# Patient Record
Sex: MALE | Race: WHITE | HISPANIC OR LATINO | Employment: FULL TIME | URBAN - METROPOLITAN AREA
[De-identification: names, ages, dates, MRNs, and addresses within clinical notes are randomized per-mention and may not be internally consistent; named-entity substitution may affect disease eponyms.]

---

## 2017-03-31 ENCOUNTER — OCCUPATIONAL MEDICINE (OUTPATIENT)
Dept: URGENT CARE | Facility: PHYSICIAN GROUP | Age: 47
End: 2017-03-31
Payer: COMMERCIAL

## 2017-03-31 VITALS
WEIGHT: 190 LBS | TEMPERATURE: 98.1 F | SYSTOLIC BLOOD PRESSURE: 124 MMHG | HEIGHT: 69 IN | HEART RATE: 69 BPM | OXYGEN SATURATION: 96 % | DIASTOLIC BLOOD PRESSURE: 86 MMHG | RESPIRATION RATE: 20 BRPM | BODY MASS INDEX: 28.14 KG/M2

## 2017-03-31 DIAGNOSIS — Z02.1 PRE-EMPLOYMENT DRUG SCREENING: ICD-10-CM

## 2017-03-31 DIAGNOSIS — S39.012A LOW BACK STRAIN, INITIAL ENCOUNTER: ICD-10-CM

## 2017-03-31 LAB
AMP AMPHETAMINE: NORMAL
COC COCAINE: NORMAL
INT CON NEG: NEGATIVE
INT CON POS: POSITIVE
MET METHAMPHETAMINES: NORMAL
OPI OPIATES: NORMAL
PCP PHENCYCLIDINE: NORMAL
POC DRUG COMMENT 753798-OCCUPATIONAL HEALTH: NORMAL
THC: NORMAL

## 2017-03-31 PROCEDURE — 80300 POCT 6 PANEL URINE DRUG SCREEN - INSTANT: CPT | Mod: 29 | Performed by: NURSE PRACTITIONER

## 2017-03-31 PROCEDURE — 99203 OFFICE O/P NEW LOW 30 MIN: CPT | Mod: 29 | Performed by: NURSE PRACTITIONER

## 2017-03-31 RX ORDER — ACETAMINOPHEN 325 MG/1
650 TABLET ORAL EVERY 4 HOURS PRN
COMMUNITY

## 2017-03-31 ASSESSMENT — ENCOUNTER SYMPTOMS
NAUSEA: 0
DIARRHEA: 0
FALLS: 0
ABDOMINAL PAIN: 0
SHORTNESS OF BREATH: 0
ORTHOPNEA: 0
TINGLING: 1
VOMITING: 0
SENSORY CHANGE: 1
COUGH: 0
MYALGIAS: 1
FEVER: 0
PALPITATIONS: 0
CHILLS: 0
BACK PAIN: 1
WEAKNESS: 0
CONSTIPATION: 0

## 2017-03-31 ASSESSMENT — PAIN SCALES - GENERAL: PAINLEVEL: 7=MODERATE-SEVERE PAIN

## 2017-03-31 NOTE — Clinical Note
"EMPLOYEE’S CLAIM FOR COMPENSATION/ REPORT OF INITIAL TREATMENT  FORM C-4    EMPLOYEE’S CLAIM - PROVIDE ALL INFORMATION REQUESTED   First Name  Abdoul Last Name  Jam Birthdate                    1970                Sex  male Claim Number   Home Address  [address] Age  46 y.o. Height  1.753 m (5' 9\") Weight  86.183 kg (190 lb) [facility]     [facility] [place]  [address] Telephone  [phone] (home)    Mailing Address  [address] [facility] [place]  [address] Primary Language Spoken  English    Insurer  Employers Ins Third Party    Employers Ins.   Employee's Occupation (Job Title) When Injury or Occupational Disease Occurred      Employer's Name  [employer]      Employer Address   [address] [place]   [address]   Date of Injury                 Hour of Injury   Date Employer Notified   Last Day of Work after Injury or Occupational Disease   Supervisor to Whom Injury Reported     Address or Location of Accident (if applicable)     What were you doing at the time of accident? (if applicable)      How did this injury or occupational disease occur? (Be specific an answer in detail. Use additional sheet if necessary)     If you believe that you have an occupational disease, when did you first have knowledge of the disability and it relationship to your employment?   Witnesses to the Accident        Nature of Injury or Occupational Disease    Part(s) of Body Injured or Affected  , ,     I certify that the above is true and correct to the best of my knowledge and that I have provided this information in order to obtain the benefits of [place]  I hereby authorize any physician, chiropractor, surgeon, practitioner, or other person, any hospital, " including [facility] hospital, any medical service organization, any insurance company, or other institution or organization to release to each other, any medical or other information, including benefits paid or payable, pertinent to this injury or disease, except information relative to diagnosis, treatment and/or counseling for AIDS, psychological conditions, alcohol or controlled substances, for which I must give specific authorization.  A Photostat of this authorization shall be as valid as the original.     Date   Place   Employee’s Signature   THIS REPORT MUST BE COMPLETED AND MAILED WITHIN 3 WORKING DAYS OF TREATMENT   Place  [facility]  Name of Facility  [place]   Date  3/31/2017 Diagnosis  (Z02.1) Pre-employment drug screening  (S39.012A) Low back strain, initial encounter Is there evidence the injured employee was under the influence of alcohol and/or another controlled substance at the time of accident?   Hour  9:22 AM Description of Injury or Disease  Diagnoses of Pre-employment drug screening and Low back strain, initial encounter were pertinent to this visit. No   Treatment  May use Ibuprofen up to  600 mg every 4-6 hours as needed for pain, may use ice/warm application as needed for pain up to 4x/day, may use topical analgesic for pain as needed, small muscle stretches as tolerated up to 4x/day.  Have you advised the patient to remain off work five days or more? No   X-Ray Findings      If Yes   From Date  To Date      From information given by the employee, together with medical evidence, can you directly connect this injury or occupational disease as job incurred?  Yes If No Full Duty  No Modified Duty  Yes   Is additional medical care by a physician indicated?  Yes If Modified Duty, Specify any Limitations / Restrictions  No push/pull, no stoop, bend, squat. No sitting > 2hrs/day. No reaching above or below with right arm. No carry/lift > 10#.   Do  "you know of any previous injury or disease contributing to this condition or occupational disease?                            No   Date  3/31/2017 Print Doctor’s Name MIMI Oconnor I certify the employer’s copy of  this form was mailed on:   Address  [address] [place] [address]. [address] Insurer’s Use Only     [facility] Zip  [address]    Provider’s Tax ID Number  [id number]  Telephone  Dept: [phone]        e-BENJAMIN Miranda   e-Signature: Dr. Kalyan Carson, Medical Director Degree  APRN        ORIGINAL-TREATING PHYSICIAN OR CHIROPRACTOR    PAGE 2-INSURER/TPA    PAGE 3-EMPLOYER    PAGE 4-EMPLOYEE             Form C-4 (rev10/07)              BRIEF DESCRIPTION OF RIGHTS AND BENEFITS  (Pursuant to [place] 616C.050)    Notice of Injury or Occupational Disease (Incident Report Form C-1): If an injury or occupational disease (OD) arises out of and in the  course of employment, you must provide written notice to your employer as soon as practicable, but no later than 7 days after the accident or  OD. Your employer shall maintain a sufficient supply of the required forms.    Claim for Compensation (Form C-4): If medical treatment is sought, the form C-4 is available at the place of initial treatment. A completed  \"Claim for Compensation\" (Form C-4) must be filed within 90 days after an accident or OD. The treating physician or chiropractor must,  within 3 working days after treatment, complete and mail to the employer, the employer's insurer and third-party , the Claim for  Compensation.    Medical Treatment: If you require medical treatment for your on-the-job injury or OD, you may be required to select a physician or  chiropractor from a list provided by your workers’ compensation insurer, if it has contracted with an Organization for Managed Care (MCO) or  Preferred Provider Organization (PPO) or providers of health care. If your employer has not entered into a contract with an " MCO or PPO, you  may select a physician or chiropractor from the Panel of Physicians and Chiropractors. Any medical costs related to your industrial injury or  OD will be paid by your insurer.    Temporary Total Disability (TTD): If your doctor has certified that you are unable to work for a period of at least 5 consecutive days, or 5  cumulative days in a 20-day period, or places restrictions on you that your employer does not accommodate, you may be entitled to TTD  compensation.    Temporary Partial Disability (TPD): If the wage you receive upon reemployment is less than the compensation for TTD to which you are  entitled, the insurer may be required to pay you TPD compensation to make up the difference. TPD can only be paid for a maximum of 24  months.    Permanent Partial Disability (PPD): When your medical condition is stable and there is an indication of a PPD as a result of your injury or  OD, within 30 days, your insurer must arrange for an evaluation by a rating physician or chiropractor to determine the degree of your PPD. The  amount of your PPD award depends on the date of injury, the results of the PPD evaluation and your age and wage.    Permanent Total Disability (PTD): If you are medically certified by a treating physician or chiropractor as permanently and totally disabled  and have been granted a PTD status by your insurer, you are entitled to receive monthly benefits not to exceed 66 2/3% of your average  monthly wage. The amount of your PTD payments is subject to reduction if you previously received a PPD award.    Vocational Rehabilitation Services: You may be eligible for vocational rehabilitation services if you are unable to return to the job due to a  permanent physical impairment or permanent restrictions as a result of your injury or occupational disease.    Transportation and Per Sid Reimbursement: You may be eligible for travel expenses and per sid associated with medical  treatment.    Reopening: You may be able to reopen your claim if your condition worsens after claim closure.    Appeal Process: If you disagree with a written determination issued by the insurer or the insurer does not respond to your request, you may  appeal to the Department of Administration, , by following the instructions contained in your determination letter. You must  appeal the determination within 70 days from the date of the determination letter at [address], [address], [place], [place]  [address], or [address], [address], [place], [place] [address]. If you disagree with the  decision, you may appeal to the  Department of Administration, . You must file your appeal within 30 days from the date of the  decision  letter at [address], [address], [place], [place] [address], or [address], [place], [place] [address]. If you  disagree with a decision of an , you may file a petition for judicial review with the District Court. You must do so within 30  days of the Appeal Officer’s decision. You may be represented by an  at your own expense or you may contact the [facility] for possible  representation.    [place]  for Injured Workers (NAIW): If you disagree with a  decision, you may request that NAIW represent you  without charge at an  Hearing. For information regarding denial of benefits, you may contact the [facility] at: [address], [address], [place] [address], [phone], or [address], [address], [place] [address], [phone]    To File a Complaint with the Division: If you wish to file a complaint with the  of the Division of Industrial Relations (DIR),  please contact the Workers’ Compensation Section, [address], [address], [place], [place] [address], telephone [phone], or  [address], [address], [place], [place] [address], telephone [phone].    For assistance with Workers’ Compensation Issues: you may contact the Office of the Governor Consumer Health Assistance, [address] [place] [address], [address], [place], [place] [address], Toll Free 1-359.912.2237, Web site: [email or web address]., E-mail  [email or web address].                                                                                                                                                                                                                                   __________________________________________________________________                                                                   _________________                Employee Name / Signature                                                                                                                                                       Date                                                                                                                                                                                                     D-2 (rev. 10/07)

## 2017-03-31 NOTE — MR AVS SNAPSHOT
"        Abdoul Goldstein MontenegroAyush   3/31/2017 9:10 AM   Occupational Medicine   MRN: [id number]    Department:  [facility]   Dept Phone:  [phone]    Description:  Male : 1970   Provider:  MIMI Oconnor           Reason for Visit     Low Back Pain WC IV 17 Low Back Pain. Pt complains of back pain after pushing materials at work. Pain radiates into legs. Painful to move, walk.      Allergies as of 3/31/2017     No Known Allergies      You were diagnosed with     Pre-employment drug screening   [733823]       Low back strain, initial encounter   [916558]         Vital Signs     Blood Pressure Pulse Temperature Respirations Height Weight    124/86 mmHg 69 36.7 °C (98.1 °F) 20 1.753 m (5' 9\") 86.183 kg (190 lb)    Body Mass Index Oxygen Saturation                28.05 kg/m2 96%          Basic Information     Date Of Birth Sex Race Ethnicity Preferred Language    1970 Male  or , White  Origin (Faroese,Hong Konger,English,Kyrgyz, etc) English      Your appointments     2017  9:00 AM   Workers Compensation with [facility])    [address]  [place] [address] [phone]              Problem List              ICD-10-CM Priority Class Noted - Resolved    Other chest pain R07.89 High  2012 - Present    Nonspecific abnormal electrocardiogram (ECG) (EKG) R94.31 High  2012 - Present      Health Maintenance        Date Due Completion Dates    IMM INFLUENZA (1) 2016 ---    IMM DTaP/Tdap/Td Vaccine (2 - Td) 3/19/2026 3/19/2016            Results     POCT 6 Panel Urine Drug Screen      Component    AMPHETAMINE    Neg    POC THC    Neg    COCAINE    Neg    OPIATES    Neg    PHENCYCLIDINE    Neg    METHAMPHETAMINES    Neg    POC Urine Drug Screen Comment    N/A    Internal Control Positive    Positive    Internal Control Negative    Negative                        Current " Immunizations     Tdap Vaccine 3/19/2016  6:18 PM      Below and/or attached are the medications your provider expects you to take. Review all of your home medications and newly ordered medications with your provider and/or pharmacist. Follow medication instructions as directed by your provider and/or pharmacist. Please keep your medication list with you and share with your provider. Update the information when medications are discontinued, doses are changed, or new medications (including over-the-counter products) are added; and carry medication information at all times in the event of emergency situations     Allergies:  No Known Allergies          Medications  Valid as of: March 31, 2017 - 11:19 AM    Generic Name Brand Name Tablet Size Instructions for use    Acetaminophen (Tab) TYLENOL 325 MG Take 650 mg by mouth every four hours as needed.        Hydrocodone-Acetaminophen (Tab) NORCO 5-325 MG Take 1-2 Tabs by mouth every 6 hours as needed.        .                 Medicines prescribed today were sent to:     [id number] - [place], [place] - [address] [address]    Phone: [phone] Fax: [phone]    Open 24 Hours?: No      Medication refill instructions:       If your prescription bottle indicates you have medication refills left, it is not necessary to call your provider’s office. Please contact your pharmacy and they will refill your medication.    If your prescription bottle indicates you do not have any refills left, you may request refills at any time through one of the following ways: The online [employer] system (except Urgent Care), by calling your provider’s office, or by asking your pharmacy to contact your provider’s office with a refill request. Medication refills are processed only during regular business hours and may not be available until the next business day. Your provider may request additional information or to have a follow-up visit with you prior to  refilling your medication.   *Please Note: Medication refills are assigned a new Rx number when refilled electronically. Your pharmacy may indicate that no refills were authorized even though a new prescription for the same medication is available at the pharmacy. Please request the medicine by name with the pharmacy before contacting your provider for a refill.           [employer] Access Code: [email or web address]  Expires: 4/30/2017 11:19 AM    [employer]  A secure, online tool to manage your health information     [employer]’s [employer]® is a secure, online tool that connects you to your personalized health information from the privacy of your home -- day or night - making it very easy for you to manage your healthcare. Once the activation process is completed, you can even access your medical information using the [employer] [place], which is available for free in the Apple [place] store or Google Play store.     [employer] provides the following levels of access (as shown below):   My Chart Features   [facility] Primary Care Doctor [facility]  Specialists [facility]  Urgent  Care Non-[facility]  Primary Care  Doctor   Email your healthcare team securely and privately 24/7 X X X    Manage appointments: schedule your next appointment; view details of past/upcoming appointments X      Request prescription refills. X      View recent personal medical records, including lab and immunizations X X X X   View health record, including health history, allergies, medications X X X X   Read reports about your outpatient visits, procedures, consult and ER notes X X X X   See your discharge summary, which is a recap of your hospital and/or ER visit that includes your diagnosis, lab results, and care plan. X X       How to register for [employer]:  1. Go to  [email or web address].  2. Click on the Sign Up Now box, which takes you to the New Member Sign Up page. You will need to provide the following information:  a. Enter your [employer] Access Code exactly as it  appears at the top of this page. (You will not need to use this code after you’ve completed the sign-up process. If you do not sign up before the expiration date, you must request a new code.)   b. Enter your date of birth.   c. Enter your home email address.   d. Click Submit, and follow the next screen’s instructions.  3. Create a [employer] ID. This will be your [employer] login ID and cannot be changed, so think of one that is secure and easy to remember.  4. Create a [employer] password. You can change your password at any time.  5. Enter your Password Reset Question and Answer. This can be used at a later time if you forget your password.   6. Enter your e-mail address. This allows you to receive e-mail notifications when new information is available in [employer].  7. Click Sign Up. You can now view your health information.    For assistance activating your [employer] account, call [phone]

## 2017-03-31 NOTE — PROGRESS NOTES
"Subjective:      Abdoul Polk is a 46 y.o. male who presents with Low Back Pain      DOI 3/30/17. 630p.  used. Pushing machine rolls of 600#, \"cover slipped\" and felt pain in right side lower back. C/o numbness/tingling/pain from tailbone down my leg to my right foot. C/o pain with ambulation and sitting down. Unable to lay down in comfortable position at night.  NSAID taken yesterday last night, 2 tabs. Tylenol today, none. Pain level today 8/10. Warm application with an \"ointment\" for pain which helped last night. No problems with urination/bowel movement. Three years ago had 'same injury on same machine with same back pain'. Was given pills and next day he was able to go back to work. Monday-Saturday work week off this Saturday, walking makes pain better but \"hurts more\" with sitting. Denies weakness in right leg.     HPI  See above    PMH:  has no past medical history on file.  MEDS:   Current outpatient prescriptions:   •  acetaminophen (TYLENOL) 325 MG Tab, Take 650 mg by mouth every four hours as needed., Disp: , Rfl:   •  hydrocodone-acetaminophen (NORCO) 5-325 MG Tab per tablet, Take 1-2 Tabs by mouth every 6 hours as needed., Disp: 20 Tab, Rfl: 0  ALLERGIES: No Known Allergies  SURGHX: History reviewed. No pertinent past surgical history.  SOCHX:    FH: Family history was reviewed, no pertinent findings to report    Review of Systems   Constitutional: Negative for fever, chills and malaise/fatigue.   Respiratory: Negative for cough and shortness of breath.    Cardiovascular: Negative for chest pain, palpitations and orthopnea.   Gastrointestinal: Negative for nausea, vomiting, abdominal pain, diarrhea and constipation.   Genitourinary: Negative for dysuria.   Musculoskeletal: Positive for myalgias and back pain. Negative for falls.   Neurological: Positive for tingling and sensory change. Negative for weakness.   All other systems reviewed and are negative.         Objective: " "    /86 mmHg  Pulse 69  Temp(Src) 36.7 °C (98.1 °F)  Resp 20  Ht 1.753 m (5' 9\")  Wt 86.183 kg (190 lb)  BMI 28.05 kg/m2  SpO2 96%     Physical Exam   Constitutional: He is oriented to person, place, and time. He appears well-developed and well-nourished. No distress.   HENT:   Head: Normocephalic.   Eyes: Conjunctivae and EOM are normal. Pupils are equal, round, and reactive to light.   Neck: Normal range of motion. Neck supple.   Cardiovascular: Normal rate and regular rhythm.    Pulmonary/Chest: Effort normal and breath sounds normal.   Musculoskeletal:        Lumbar back: He exhibits decreased range of motion, tenderness, pain and spasm. He exhibits no bony tenderness, no swelling, no edema, no deformity and normal pulse.        Back:    Neurological: He is alert and oriented to person, place, and time.   Skin: Skin is warm and dry. He is not diaphoretic.   Vitals reviewed.      A/O x 3. Swazi speaking. C/o TTP right lower back. Increased pain when turning to left side with sitting and standing. Ambulates slowly and favors right leg. Skin p/w/d, skin sensation intact. Equal foot/leg strength.         Assessment/Plan:     1. Pre-employment drug screening    - POCT 6 Panel Urine Drug Screen    2. Low back strain, initial encounter    Take Ibuprofen prn for discomfort  May use cool compresses for swelling and warm compresses for muscle stiffness   May perform muscle stretches as tolerated after warm compresses to maintain mobility, avoid abdominal crunches  May apply topical analgesics prn  Perform proper body mechanics with lifting, twisting, bending and reaching. Ask for assistance with heavy objects, see work restrictions  Monitor for bowel/urination problems, numbness/tingling in lower extremities, decreased ROM with ambulation difficulty- need re-evaluation  Recheck on 4/4/17            "

## 2017-03-31 NOTE — Clinical Note
"   [facility]   [address] [place] [address]. [address] [place] [address]  Phone: [phone] - Fax: [phone]        [facility] Progress Report and Disability Certification  Date of Service: 3/31/2017   No Show:  No  Date / Time of Next Visit: 4/4/2017 @ 9:00 AM   Claim Information   Patient Name: Abdoul Polk  Claim Number:     Employer: [employer] Date of Injury:      Insurer / TPA:   Employers Ins ID / SSN:     Occupation:   Diagnosis: Diagnoses of Pre-employment drug screening and Low back strain, initial encounter were pertinent to this visit.    Medical Information   Related to Industrial Injury? Yes    Subjective Complaints:  DOI 3/30/17. 630p.  used. Pushing machine rolls of 600#, \"cover slipped\" and felt pain in right side lower back. C/o numbness/tingling/pain from tailbone down my leg to my right foot. C/o pain with ambulation and sitting down. Unable to lay down in comfortable position at night.  NSAID taken yesterday last night, 2 tabs. Tylenol today, none. Pain level today 8/10. Warm application with an \"ointment\" for pain which helped last night. No problems with urination/bowel movement. Three years ago had 'same injury on same machine with same back pain'. Was given pills and next day he was able to go back to work. Monday-Saturday work week off this Saturday, walking makes pain better but \"hurts more\" with sitting. Denies weakness in right leg.   Objective Findings: A/O x 3. French speaking. C/o TTP right lower back. Increased pain when turning to left side with sitting and standing. Ambulates slowly and favors right leg. Skin p/w/d, skin sensation intact. Equal foot/leg strength.     Pre-Existing Condition(s):     Assessment:   Initial Visit    Status: Additional Care Required  Permanent Disability:No    Plan:   Comments:Ibuprofen up to 600 mg every 4-6 hours as needed for pain    "   Diagnostics:      Comments:       Disability Information   Status: Released to Restricted Duty    From:  3/31/2017  Through: 2017 Restrictions are: Temporary   Physical Restrictions   Sitting:  < or = to 2 hrs/day Standing:    Stoopin hrs/day [place] hrs/day   Squattin hrs/day Walking:    Climbing:    Pushin hrs/day   Pullin hrs/day Other:    Reaching Above Shoulder (L):   Reaching Above Shoulder (R): 0 hrs/day     Reaching Below Shoulder (L):    Reaching Below Shoulder (R):  0 hrs/day   Not to exceed Weight Limits   Carrying(hrs):   Weight Limit(lb): < or = to 10 pounds Lifting(hrs):   Weight  Limit(lb): < or = to 10 pounds   Comments: May use Ibuprofen up to  600 mg every 4-6 hours as needed for pain, may use ice/warm application as needed for pain up to 4x/day, may use topical analgesic for pain as needed, small muscle stretches as tolerated up to 4x/day. Recheck on 17.    Repetitive Actions   Hands: i.e. Fine Manipulations from Grasping:     Feet: i.e. Operating Foot Controls:     Driving / Operate Machinery:     Physician Name: MIMI Oconnor Physician Signature: BENJAMIN Falcon e-Signature: Dr. Kalyan Carson, Medical Director   Clinic Name / Location: [address]  [place] [address] Clinic Phone Number: Dept: [phone]   Appointment Time: 9:10 Am Visit Start Time: 9:22 AM   Check-In Time:  9:11 Am Visit Discharge Time: 11:02 AM   Original-Treating Physician or Chiropractor    Page 2-Insurer/TPA    Page 3-Employer    Page 4-Employee

## 2017-03-31 NOTE — Clinical Note
"EMPLOYEE’S CLAIM FOR COMPENSATION/ REPORT OF INITIAL TREATMENT  FORM C-4    EMPLOYEE’S CLAIM - PROVIDE ALL INFORMATION REQUESTED   First Name  Abdoul Last Name  Jam Birthdate                    1970                Sex  male Claim Number   Home Address  [place] Age  46 y.o. Height  1.753 m (5' 9\") Weight  86.183 kg (190 lb) [facility]     [facility] Zip  [address] Telephone  [phone] (home)    Mailing Address  [address] [facility] Zip  [address] Primary Language Spoken  English    Insurer   Third Party   Employers Insurance   Employee's Occupation (Job Title) When Injury or Occupational Disease Occurred      Employer's Name   [employer] Telephone  [phone]    Employer Address  [address]  Zip  [address]    Date of Injury  3/30/2017               Hour of Injury  6:30 PM Date Employer Notified  3/31/2017 Last Day of Work after Injury or Occupational Disease  3/31/2017 Supervisor to Whom Injury Reported  Semaj Gonzáles   Address or Location of Accident (if applicable)  [[place] [address]]   What were you doing at the time of accident? (if applicable)  Pushing rolls    How did this injury or occupational disease occur? (Be specific an answer in detail. Use additional sheet if necessary)  lifting two heavy rolls.   If you believe that you have an occupational disease, when did you first have knowledge of the disability and it relationship to your employment?  n/a Witnesses to the Accident  n/a      Nature of Injury or Occupational Disease  Strain  Part(s) of Body Injured or Affected  Lower Back Area (Lumbar Area & Lumbo-Sacral), ,     I certify that the above is true and correct to the best of my knowledge and that I have provided this information in order to obtain the benefits of [place]  I hereby authorize any physician, chiropractor, surgeon, practitioner, or other person, any hospital, including [facility] or [facility], any medical service organization, any insurance company, or other institution or organization to release to each other, any medical or other information, including benefits paid or payable, pertinent to this injury or disease, except information relative to diagnosis, treatment and/or counseling for AIDS, psychological conditions, alcohol or controlled substances, for which I must give specific authorization.  A Photostat of this authorization shall be as valid as the original.     Date   Place   Employee’s Signature   THIS REPORT MUST BE COMPLETED AND MAILED WITHIN 3 WORKING DAYS OF TREATMENT   Place  [facility]  Name of Facility  [facility]   Date  3/31/2017 Diagnosis  (Z02.1) Pre-employment drug screening  (S39.012A) Low back strain, initial encounter Is there evidence the injured employee was under the influence of alcohol and/or another controlled substance at the time of accident?   Hour   Description of Injury or Disease  Diagnoses of Pre-employment drug screening and Low back strain, initial encounter were pertinent to this visit. No   Treatment  May use Ibuprofen up to  600 mg every 4-6 hours as needed for pain, may use ice/warm application as needed for pain up to 4x/day, may use topical analgesic for pain as needed, small muscle stretches as tolerated up to 4x/day.  Have you advised the patient to remain off work five days or more? No   X-Ray Findings      If Yes   From Date  To Date      From information given by the employee, together with medical evidence, can you directly connect this injury or occupational disease as job incurred?  Yes If No Full Duty  No Modified Duty  Yes   Is additional medical care by a physician indicated?  Yes If Modified Duty,  "Specify any Limitations / Restrictions  No push/pull, no stoop, bend, squat. No sitting > 2hrs/day. No reaching above or below with right arm. No carry/lift > 10#.   Do you know of any previous injury or disease contributing to this condition or occupational disease?                            No   Date  3/31/2017 Print Doctor’s Name MIMI Oconnor I certify the employer’s copy of  this form was mailed on:   Address  [address] [place] [address]. [address] Insurer’s Use Only     [facility]  [address]    Provider’s Tax ID Number  [id number]  Telephone  Dept: [phone]        e-BENJAMIN Miranda   e-Signature: Dr. Kalyan Carson, Medical Director Degree  APRN        ORIGINAL-TREATING PHYSICIAN OR CHIROPRACTOR    PAGE 2-INSURER/TPA    PAGE 3-EMPLOYER    PAGE 4-EMPLOYEE             Form C-4 (rev10/07)              BRIEF DESCRIPTION OF RIGHTS AND BENEFITS  (Pursuant to [place] 616C.050)    Notice of Injury or Occupational Disease (Incident Report Form C-1): If an injury or occupational disease (OD) arises out of and in the  course of employment, you must provide written notice to your employer as soon as practicable, but no later than 7 days after the accident or  OD. Your employer shall maintain a sufficient supply of the required forms.    Claim for Compensation (Form C-4): If medical treatment is sought, the form C-4 is available at the place of initial treatment. A completed  \"Claim for Compensation\" (Form C-4) must be filed within 90 days after an accident or OD. The treating physician or chiropractor must,  within 3 working days after treatment, complete and mail to the employer, the employer's insurer and third-party , the Claim for  Compensation.    Medical Treatment: If you require medical treatment for your on-the-job injury or OD, you may be required to select a physician or  chiropractor from a list provided by your workers’ compensation insurer, if it has contracted with " an Organization for Managed Care (MCO) or  Preferred Provider Organization (PPO) or providers of health care. If your employer has not entered into a contract with an MCO or PPO, you  may select a physician or chiropractor from the Panel of Physicians and Chiropractors. Any medical costs related to your industrial injury or  OD will be paid by your insurer.    Temporary Total Disability (TTD): If your doctor has certified that you are unable to work for a period of at least 5 consecutive days, or 5  cumulative days in a 20-day period, or places restrictions on you that your employer does not accommodate, you may be entitled to TTD  compensation.    Temporary Partial Disability (TPD): If the wage you receive upon reemployment is less than the compensation for TTD to which you are  entitled, the insurer may be required to pay you TPD compensation to make up the difference. TPD can only be paid for a maximum of 24  months.    Permanent Partial Disability (PPD): When your medical condition is stable and there is an indication of a PPD as a result of your injury or  OD, within 30 days, your insurer must arrange for an evaluation by a rating physician or chiropractor to determine the degree of your PPD. The  amount of your PPD award depends on the date of injury, the results of the PPD evaluation and your age and wage.    Permanent Total Disability (PTD): If you are medically certified by a treating physician or chiropractor as permanently and totally disabled  and have been granted a PTD status by your insurer, you are entitled to receive monthly benefits not to exceed 66 2/3% of your average  monthly wage. The amount of your PTD payments is subject to reduction if you previously received a PPD award.    Vocational Rehabilitation Services: You may be eligible for vocational rehabilitation services if you are unable to return to the job due to a  permanent physical impairment or permanent restrictions as a result of your  injury or occupational disease.    Transportation and Per Sid Reimbursement: You may be eligible for travel expenses and per sid associated with medical treatment.    Reopening: You may be able to reopen your claim if your condition worsens after claim closure.    Appeal Process: If you disagree with a written determination issued by the insurer or the insurer does not respond to your request, you may  appeal to the Department of Administration, , by following the instructions contained in your determination letter. You must  appeal the determination within 70 days from the date of the determination letter at [address], [address], [place], [place]  [address], or [address], [address], [place], [place] [address]. If you disagree with the  decision, you may appeal to the  Department of Administration, . You must file your appeal within 30 days from the date of the  decision  letter at [address], [address], [place], [place] [address], or [address], [facility] [address], [place] [address]. If you  disagree with a decision of an , you may file a petition for judicial review with the District Court. You must do so within 30  days of the Appeal Officer’s decision. You may be represented by an  at your own expense or you may contact the [facility] for possible  representation.    [place]  for Injured Workers (NAIW): If you disagree with a  decision, you may request that NAIW represent you  without charge at an  Hearing. For information regarding denial of benefits, you may contact the [facility] at: [address], [address], [place] [address], [phone], or [address] [facility] [address], [place] [address], [phone]    To File a Complaint with the Division: If you wish to file a complaint with the  of the Division of Industrial Relations  (DIR),  please contact the Workers’ Compensation Section, [address], [address], [place], [place] [address], telephone [phone], or  [address], [address], [place], [place] [address], telephone [phone].    For assistance with Workers’ Compensation Issues: you may contact the Office of the [facility] Consumer Health Assistance, [address] [place] [address], [address], [place], [place] [address], Toll Free 1-256.389.1285, Web site: http://govcha.[facility].nv., E-mail  Ghazala@[facility].[place].                                                                                                                                                                                                                                   __________________________________________________________________                                                                   _________________                Employee Name / Signature                                                                                                                                                       Date                                                                                                                                                                                                     D-2 (rev. 10/07)

## 2017-04-04 ENCOUNTER — OCCUPATIONAL MEDICINE (OUTPATIENT)
Dept: URGENT CARE | Facility: PHYSICIAN GROUP | Age: 47
End: 2017-04-04
Payer: COMMERCIAL

## 2017-04-04 VITALS
TEMPERATURE: 98.8 F | SYSTOLIC BLOOD PRESSURE: 118 MMHG | DIASTOLIC BLOOD PRESSURE: 76 MMHG | HEART RATE: 76 BPM | HEIGHT: 69 IN | OXYGEN SATURATION: 97 % | RESPIRATION RATE: 16 BRPM | BODY MASS INDEX: 28.14 KG/M2 | WEIGHT: 190 LBS

## 2017-04-04 DIAGNOSIS — M54.9 DORSALGIA: ICD-10-CM

## 2017-04-04 PROCEDURE — 99214 OFFICE O/P EST MOD 30 MIN: CPT | Performed by: FAMILY MEDICINE

## 2017-04-04 ASSESSMENT — ENCOUNTER SYMPTOMS
FEVER: 0
CHILLS: 0
FOCAL WEAKNESS: 0
MYALGIAS: 0
SENSORY CHANGE: 0
DIZZINESS: 0
FALLS: 0
BACK PAIN: 0

## 2017-04-04 ASSESSMENT — PAIN SCALES - GENERAL: PAINLEVEL: NO PAIN

## 2017-04-04 NOTE — PROGRESS NOTES
"Subjective:      Abdoul Polk is a 46 y.o. male who presents with Back Injury      DOI 3/30/17. 630p.  used. Pushing machine rolls of 600#, \"cover slipped\" and felt pain in right side lower back. C/o numbness/tingling/pain from tailbone down my leg to my right foot. C/o pain with ambulation and sitting down. Unable to lay down in comfortable position at night.  NSAID taken yesterday last night, 2 tabs.    * feels better, no more pain, wants to get MMI     HPI    Review of Systems   Constitutional: Negative for fever and chills.   Musculoskeletal: Negative for myalgias, back pain, joint pain and falls.   Neurological: Negative for dizziness, sensory change and focal weakness.          Objective:     /76 mmHg  Pulse 76  Temp(Src) 37.1 °C (98.8 °F)  Resp 16  Ht 1.753 m (5' 9\")  Wt 86.183 kg (190 lb)  BMI 28.05 kg/m2  SpO2 97%     Physical Exam   Constitutional: He appears well-developed. No distress.   HENT:   Head: Normocephalic and atraumatic.   Eyes: Conjunctivae are normal.   Neck: Neck supple.   Cardiovascular: Regular rhythm.    No murmur heard.  Neurological: He is alert. He exhibits normal muscle tone.   Skin: Skin is warm and dry.   Psychiatric: He has a normal mood and affect. Judgment normal.   Nursing note and vitals reviewed.      Bilateral lower extremity strength and sensory intact.  Negative straight leg raise.   Can toe and heel walk  Some pain to palp/rom        Assessment/Plan:         1. Dorsalgia         MMI                    "

## 2017-04-04 NOTE — MR AVS SNAPSHOT
"        Abdoul Polk   2017 9:00 AM   Occupational Medicine   MRN: [id number]    Department:  [facility]   Dept Phone:  [phone]    Description:  Male : 1970   Provider:  Dylon Farah M.D.           Reason for Visit     Back Injury WC FU 17 Back Strain. Pt states he's feeling much better and is ready to go back to work.      Allergies as of 2017     No Known Allergies      You were diagnosed with     Dorsalgia   [165203]         Vital Signs     Blood Pressure Pulse Temperature Respirations Height Weight    118/76 mmHg 76 37.1 °C (98.8 °F) 16 1.753 m (5' 9\") 86.183 kg (190 lb)    Body Mass Index Oxygen Saturation Smoking Status             28.05 kg/m2 97% Unknown If Ever Smoked         Basic Information     Date Of Birth Sex Race Ethnicity Preferred Language    1970 Male  or , White  Origin (Cape Verdean,Mongolian,Peruvian,Egyptian, etc) English      Problem List              ICD-10-CM Priority Class Noted - Resolved    Other chest pain R07.89 High  2012 - Present    Nonspecific abnormal electrocardiogram (ECG) (EKG) R94.31 High  2012 - Present      Health Maintenance        Date Due Completion Dates    IMM DTaP/Tdap/Td Vaccine (2 - Td) 3/19/2026 3/19/2016            Current Immunizations     Tdap Vaccine 3/19/2016  6:18 PM      Below and/or attached are the medications your provider expects you to take. Review all of your home medications and newly ordered medications with your provider and/or pharmacist. Follow medication instructions as directed by your provider and/or pharmacist. Please keep your medication list with you and share with your provider. Update the information when medications are discontinued, doses are changed, or new medications (including over-the-counter products) are added; and carry medication information at all times in the event of emergency situations     Allergies:  No Known Allergies          Medications  " Valid as of: April 04, 2017 -  9:53 AM    Generic Name Brand Name Tablet Size Instructions for use    Acetaminophen (Tab) TYLENOL 325 MG Take 650 mg by mouth every four hours as needed.        Hydrocodone-Acetaminophen (Tab) NORCO 5-325 MG Take 1-2 Tabs by mouth every 6 hours as needed.        .                 Medicines prescribed today were sent to:     [id number] - [place], [place] - [address] [place] [address]    Phone: [phone] Fax: [phone]    Open 24 Hours?: No      Medication refill instructions:       If your prescription bottle indicates you have medication refills left, it is not necessary to call your provider’s office. Please contact your pharmacy and they will refill your medication.    If your prescription bottle indicates you do not have any refills left, you may request refills at any time through one of the following ways: The online [employer] system (except Urgent Care), by calling your provider’s office, or by asking your pharmacy to contact your provider’s office with a refill request. Medication refills are processed only during regular business hours and may not be available until the next business day. Your provider may request additional information or to have a follow-up visit with you prior to refilling your medication.   *Please Note: Medication refills are assigned a new Rx number when refilled electronically. Your pharmacy may indicate that no refills were authorized even though a new prescription for the same medication is available at the pharmacy. Please request the medicine by name with the pharmacy before contacting your provider for a refill.           [employer] Access Code: [email or web address]  Expires: 4/30/2017 11:19 AM    [employer]  A secure, online tool to manage your health information     [employer]® is a secure, online tool that connects you to your personalized health information from the privacy of your home -- day or night -  making it very easy for you to manage your healthcare. Once the activation process is completed, you can even access your medical information using the [employer] [place], which is available for free in the [employer] [place] store or Google Play store.     [employer] provides the following levels of access (as shown below):   My Chart Features   [facility] Primary [facility] Doctor [facility]  Specialists [facility]  Urgent  Care Non-[facility]  Primary Care  Doctor   Email your healthcare team securely and privately 24/7 X X X    Manage appointments: schedule your next appointment; view details of past/upcoming appointments X      Request prescription refills. X      View recent personal medical records, including lab and immunizations X X X X   View health record, including health history, allergies, medications X X X X   Read reports about your outpatient visits, procedures, consult and ER notes X X X X   See your discharge summary, which is a recap of your hospital and/or ER visit that includes your diagnosis, lab results, and care plan. X X       How to register for [employer]:  1. Go to  [email or web address].  2. Click on the Sign Up Now box, which takes you to the New Member Sign Up page. You will need to provide the following information:  a. Enter your [employer] Access Code exactly as it appears at the top of this page. (You will not need to use this code after you’ve completed the sign-up process. If you do not sign up before the expiration date, you must request a new code.)   b. Enter your date of birth.   c. Enter your home email address.   d. Click Submit, and follow the next screen’s instructions.  3. Create a [employer] ID. This will be your [employer] login ID and cannot be changed, so think of one that is secure and easy to remember.  4. Create a [employer] password. You can change your password at any time.  5. Enter your Password Reset Question and Answer. This can be used at a later time if you forget your password.   6. Enter your e-mail  address. This allows you to receive e-mail notifications when new information is available in [employer].  7. Click Sign Up. You can now view your health information.    For assistance activating your [employer] account, call [phone]

## 2017-07-08 NOTE — Clinical Note
"   [facility] [address] - [place] [address]  Phone: [phone] - Fax: [phone]        [facility] Progress Report and Disability Certification  Date of Service: 4/4/2017   No Show:  No  Date / Time of Next Visit:     Claim Information   Patient Name: Abdoul Polk  Claim Number:     Employer: [employer]  Date of Injury: 3/30/2017     Insurer / TPA: Employers Insurance  ID / SSN:     Occupation:   Diagnosis: The encounter diagnosis was Dorsalgia.    Medical Information   Related to Industrial Injury? Yes    Subjective Complaints:  DOI 3/30/17. 630p.  used. Pushing machine rolls of 600#, \"cover slipped\" and felt pain in right side lower back. C/o numbness/tingling/pain from tailbone down my leg to my right foot. C/o pain with ambulation and sitting down. Unable to lay down in comfortable position at night.  NSAID taken yesterday last night, 2 tabs.    * feels better, no more pain, wants to get MMI   Objective Findings: Bilateral lower extremity strength and sensory intact.  Negative straight leg raise.   Can toe and heel walk  Some pain to palp/rom    Pre-Existing Condition(s):     Assessment:   Condition Improved    Status: Discharged /  MMI  Permanent Disability:No    Plan:      Diagnostics:      Comments:       Disability Information   Status: Released to Full Duty    From:  4/4/2017  Through:   Restrictions are:     Physical Restrictions   Sitting:    Standing:    Stooping:    Bending:      Squatting:    Walking:    Climbing:    Pushing:      Pulling:    Other:    Reaching Above Shoulder (L):   Reaching Above Shoulder (R):       Reaching Below Shoulder (L):    Reaching Below Shoulder (R):      Not to exceed Weight Limits   Carrying(hrs):   Weight Limit(lb):   Lifting(hrs):   Weight  Limit(lb):     Comments: MMI    Repetitive Actions   Hands: i.e. Fine Manipulations from Grasping:    "   Feet: i.e. Operating Foot Controls:     Driving / Operate Machinery:     Physician Name: Dylon Farah M.D. Physician Signature: DYLON Magaña M.D. e-Signature: Dr. Kalyan Carson, Medical Director   Clinic Name / Location: [address]  [place] [address] Clinic Phone Number: Dept: [phone]   Appointment Time: 9:00 Am Visit Start Time: 9:08 AM   Check-In Time:  8:57 Am Visit Discharge Time: 9:48 AM   Original-Treating Physician or Chiropractor    Page 2-Insurer/TPA    Page 3-Employer    Page 4-Employee       No pertinent family history in first degree relatives

## 2019-06-08 ENCOUNTER — HOSPITAL ENCOUNTER (OUTPATIENT)
Dept: LAB | Facility: MEDICAL CENTER | Age: 49
End: 2019-06-08
Attending: PHYSICIAN ASSISTANT
Payer: COMMERCIAL

## 2019-06-08 LAB
ALBUMIN SERPL BCP-MCNC: 4.4 G/DL (ref 3.2–4.9)
ALBUMIN/GLOB SERPL: 1.7 G/DL
ALP SERPL-CCNC: 81 U/L (ref 30–99)
ALT SERPL-CCNC: 25 U/L (ref 2–50)
ANION GAP SERPL CALC-SCNC: 7 MMOL/L (ref 0–11.9)
AST SERPL-CCNC: 21 U/L (ref 12–45)
BASOPHILS # BLD AUTO: 1.1 % (ref 0–1.8)
BASOPHILS # BLD: 0.04 K/UL (ref 0–0.12)
BILIRUB SERPL-MCNC: 0.5 MG/DL (ref 0.1–1.5)
BUN SERPL-MCNC: 14 MG/DL (ref 8–22)
CALCIUM SERPL-MCNC: 8.8 MG/DL (ref 8.5–10.5)
CHLORIDE SERPL-SCNC: 106 MMOL/L (ref 96–112)
CHOLEST SERPL-MCNC: 159 MG/DL (ref 100–199)
CO2 SERPL-SCNC: 24 MMOL/L (ref 20–33)
CREAT SERPL-MCNC: 0.74 MG/DL (ref 0.5–1.4)
EOSINOPHIL # BLD AUTO: 0.06 K/UL (ref 0–0.51)
EOSINOPHIL NFR BLD: 1.7 % (ref 0–6.9)
ERYTHROCYTE [DISTWIDTH] IN BLOOD BY AUTOMATED COUNT: 40.9 FL (ref 35.9–50)
GLOBULIN SER CALC-MCNC: 2.6 G/DL (ref 1.9–3.5)
GLUCOSE SERPL-MCNC: 107 MG/DL (ref 65–99)
HCT VFR BLD AUTO: 47.4 % (ref 42–52)
HCV AB SER QL: NEGATIVE
HDLC SERPL-MCNC: 29 MG/DL
HGB BLD-MCNC: 16.2 G/DL (ref 14–18)
HIV 1+2 AB+HIV1 P24 AG SERPL QL IA: NON REACTIVE
IMM GRANULOCYTES # BLD AUTO: 0.02 K/UL (ref 0–0.11)
IMM GRANULOCYTES NFR BLD AUTO: 0.6 % (ref 0–0.9)
LDLC SERPL CALC-MCNC: ABNORMAL MG/DL
LYMPHOCYTES # BLD AUTO: 1.13 K/UL (ref 1–4.8)
LYMPHOCYTES NFR BLD: 31.5 % (ref 22–41)
MCH RBC QN AUTO: 31 PG (ref 27–33)
MCHC RBC AUTO-ENTMCNC: 34.2 G/DL (ref 33.7–35.3)
MCV RBC AUTO: 90.6 FL (ref 81.4–97.8)
MONOCYTES # BLD AUTO: 0.34 K/UL (ref 0–0.85)
MONOCYTES NFR BLD AUTO: 9.5 % (ref 0–13.4)
NEUTROPHILS # BLD AUTO: 2 K/UL (ref 1.82–7.42)
NEUTROPHILS NFR BLD: 55.6 % (ref 44–72)
NRBC # BLD AUTO: 0 K/UL
NRBC BLD-RTO: 0 /100 WBC
PLATELET # BLD AUTO: 154 K/UL (ref 164–446)
PMV BLD AUTO: 11.4 FL (ref 9–12.9)
POTASSIUM SERPL-SCNC: 4.2 MMOL/L (ref 3.6–5.5)
PROT SERPL-MCNC: 7 G/DL (ref 6–8.2)
RBC # BLD AUTO: 5.23 M/UL (ref 4.7–6.1)
SODIUM SERPL-SCNC: 137 MMOL/L (ref 135–145)
TRIGL SERPL-MCNC: 509 MG/DL (ref 0–149)
TSH SERPL DL<=0.005 MIU/L-ACNC: 2.82 UIU/ML (ref 0.38–5.33)
WBC # BLD AUTO: 3.6 K/UL (ref 4.8–10.8)

## 2019-06-08 PROCEDURE — 80053 COMPREHEN METABOLIC PANEL: CPT

## 2019-06-08 PROCEDURE — 85025 COMPLETE CBC W/AUTO DIFF WBC: CPT

## 2019-06-08 PROCEDURE — 87389 HIV-1 AG W/HIV-1&-2 AB AG IA: CPT

## 2019-06-08 PROCEDURE — 86803 HEPATITIS C AB TEST: CPT

## 2019-06-08 PROCEDURE — 36415 COLL VENOUS BLD VENIPUNCTURE: CPT

## 2019-06-08 PROCEDURE — 80061 LIPID PANEL: CPT

## 2019-06-08 PROCEDURE — 84443 ASSAY THYROID STIM HORMONE: CPT

## 2019-06-08 PROCEDURE — 83721 ASSAY OF BLOOD LIPOPROTEIN: CPT

## 2019-06-10 LAB — LDLC SERPL-MCNC: 61 MG/DL (ref 0–129)

## 2020-02-29 ENCOUNTER — HOSPITAL ENCOUNTER (OUTPATIENT)
Dept: LAB | Facility: MEDICAL CENTER | Age: 50
End: 2020-02-29
Attending: PHYSICIAN ASSISTANT
Payer: COMMERCIAL

## 2020-02-29 ENCOUNTER — HOSPITAL ENCOUNTER (OUTPATIENT)
Facility: MEDICAL CENTER | Age: 50
End: 2020-02-29
Attending: PHYSICIAN ASSISTANT
Payer: COMMERCIAL

## 2020-02-29 LAB
ALBUMIN SERPL BCP-MCNC: 4.2 G/DL (ref 3.2–4.9)
ALBUMIN/GLOB SERPL: 1.1 G/DL
ALP SERPL-CCNC: 73 U/L (ref 30–99)
ALT SERPL-CCNC: 21 U/L (ref 2–50)
ANION GAP SERPL CALC-SCNC: 9 MMOL/L (ref 0–11.9)
AST SERPL-CCNC: 19 U/L (ref 12–45)
BASOPHILS # BLD AUTO: 1.2 % (ref 0–1.8)
BASOPHILS # BLD: 0.05 K/UL (ref 0–0.12)
BILIRUB SERPL-MCNC: 0.5 MG/DL (ref 0.1–1.5)
BUN SERPL-MCNC: 12 MG/DL (ref 8–22)
CALCIUM SERPL-MCNC: 9.2 MG/DL (ref 8.5–10.5)
CHLORIDE SERPL-SCNC: 104 MMOL/L (ref 96–112)
CHOLEST SERPL-MCNC: 154 MG/DL (ref 100–199)
CO2 SERPL-SCNC: 25 MMOL/L (ref 20–33)
CREAT SERPL-MCNC: 0.59 MG/DL (ref 0.5–1.4)
EOSINOPHIL # BLD AUTO: 0.03 K/UL (ref 0–0.51)
EOSINOPHIL NFR BLD: 0.7 % (ref 0–6.9)
ERYTHROCYTE [DISTWIDTH] IN BLOOD BY AUTOMATED COUNT: 40.4 FL (ref 35.9–50)
EST. AVERAGE GLUCOSE BLD GHB EST-MCNC: 105 MG/DL
GLOBULIN SER CALC-MCNC: 3.7 G/DL (ref 1.9–3.5)
GLUCOSE SERPL-MCNC: 105 MG/DL (ref 65–99)
HBA1C MFR BLD: 5.3 % (ref 0–5.6)
HCT VFR BLD AUTO: 46.5 % (ref 42–52)
HDLC SERPL-MCNC: 31 MG/DL
HGB BLD-MCNC: 15.6 G/DL (ref 14–18)
IMM GRANULOCYTES # BLD AUTO: 0.02 K/UL (ref 0–0.11)
IMM GRANULOCYTES NFR BLD AUTO: 0.5 % (ref 0–0.9)
LDLC SERPL CALC-MCNC: 82 MG/DL
LYMPHOCYTES # BLD AUTO: 1.4 K/UL (ref 1–4.8)
LYMPHOCYTES NFR BLD: 32.4 % (ref 22–41)
MCH RBC QN AUTO: 30.6 PG (ref 27–33)
MCHC RBC AUTO-ENTMCNC: 33.5 G/DL (ref 33.7–35.3)
MCV RBC AUTO: 91.4 FL (ref 81.4–97.8)
MONOCYTES # BLD AUTO: 0.33 K/UL (ref 0–0.85)
MONOCYTES NFR BLD AUTO: 7.6 % (ref 0–13.4)
NEUTROPHILS # BLD AUTO: 2.49 K/UL (ref 1.82–7.42)
NEUTROPHILS NFR BLD: 57.6 % (ref 44–72)
NRBC # BLD AUTO: 0 K/UL
NRBC BLD-RTO: 0 /100 WBC
PLATELET # BLD AUTO: 253 K/UL (ref 164–446)
PMV BLD AUTO: 11.2 FL (ref 9–12.9)
POTASSIUM SERPL-SCNC: 3.9 MMOL/L (ref 3.6–5.5)
PROT SERPL-MCNC: 7.9 G/DL (ref 6–8.2)
RBC # BLD AUTO: 5.09 M/UL (ref 4.7–6.1)
SODIUM SERPL-SCNC: 138 MMOL/L (ref 135–145)
TRIGL SERPL-MCNC: 203 MG/DL (ref 0–149)
WBC # BLD AUTO: 4.3 K/UL (ref 4.8–10.8)

## 2020-02-29 PROCEDURE — 83036 HEMOGLOBIN GLYCOSYLATED A1C: CPT

## 2020-02-29 PROCEDURE — 36415 COLL VENOUS BLD VENIPUNCTURE: CPT

## 2020-02-29 PROCEDURE — 85025 COMPLETE CBC W/AUTO DIFF WBC: CPT

## 2020-02-29 PROCEDURE — 82274 ASSAY TEST FOR BLOOD FECAL: CPT

## 2020-02-29 PROCEDURE — 80053 COMPREHEN METABOLIC PANEL: CPT

## 2020-02-29 PROCEDURE — 80061 LIPID PANEL: CPT

## 2020-03-13 LAB — HEMOCCULT STL QL IA: NEGATIVE

## 2020-11-07 ENCOUNTER — HOSPITAL ENCOUNTER (OUTPATIENT)
Dept: LAB | Facility: MEDICAL CENTER | Age: 50
End: 2020-11-07
Attending: PHYSICIAN ASSISTANT
Payer: COMMERCIAL

## 2020-11-07 LAB
ALBUMIN SERPL BCP-MCNC: 4.8 G/DL (ref 3.2–4.9)
ALBUMIN/GLOB SERPL: 1.6 G/DL
ALP SERPL-CCNC: 89 U/L (ref 30–99)
ALT SERPL-CCNC: 29 U/L (ref 2–50)
ANION GAP SERPL CALC-SCNC: 14 MMOL/L (ref 7–16)
AST SERPL-CCNC: 25 U/L (ref 12–45)
BASOPHILS # BLD AUTO: 1.3 % (ref 0–1.8)
BASOPHILS # BLD: 0.05 K/UL (ref 0–0.12)
BILIRUB SERPL-MCNC: 0.4 MG/DL (ref 0.1–1.5)
BUN SERPL-MCNC: 11 MG/DL (ref 8–22)
CALCIUM SERPL-MCNC: 9.4 MG/DL (ref 8.5–10.5)
CHLORIDE SERPL-SCNC: 97 MMOL/L (ref 96–112)
CHOLEST SERPL-MCNC: 182 MG/DL (ref 100–199)
CO2 SERPL-SCNC: 22 MMOL/L (ref 20–33)
CREAT SERPL-MCNC: 0.66 MG/DL (ref 0.5–1.4)
EOSINOPHIL # BLD AUTO: 0.05 K/UL (ref 0–0.51)
EOSINOPHIL NFR BLD: 1.3 % (ref 0–6.9)
ERYTHROCYTE [DISTWIDTH] IN BLOOD BY AUTOMATED COUNT: 42.5 FL (ref 35.9–50)
FASTING STATUS PATIENT QL REPORTED: NORMAL
GLOBULIN SER CALC-MCNC: 3 G/DL (ref 1.9–3.5)
GLUCOSE SERPL-MCNC: 97 MG/DL (ref 65–99)
HCT VFR BLD AUTO: 49.6 % (ref 42–52)
HDLC SERPL-MCNC: 32 MG/DL
HGB BLD-MCNC: 17 G/DL (ref 14–18)
IMM GRANULOCYTES # BLD AUTO: 0.01 K/UL (ref 0–0.11)
IMM GRANULOCYTES NFR BLD AUTO: 0.3 % (ref 0–0.9)
LDLC SERPL CALC-MCNC: 95 MG/DL
LYMPHOCYTES # BLD AUTO: 1.17 K/UL (ref 1–4.8)
LYMPHOCYTES NFR BLD: 31.3 % (ref 22–41)
MCH RBC QN AUTO: 31.3 PG (ref 27–33)
MCHC RBC AUTO-ENTMCNC: 34.3 G/DL (ref 33.7–35.3)
MCV RBC AUTO: 91.2 FL (ref 81.4–97.8)
MONOCYTES # BLD AUTO: 0.28 K/UL (ref 0–0.85)
MONOCYTES NFR BLD AUTO: 7.5 % (ref 0–13.4)
NEUTROPHILS # BLD AUTO: 2.18 K/UL (ref 1.82–7.42)
NEUTROPHILS NFR BLD: 58.3 % (ref 44–72)
NRBC # BLD AUTO: 0 K/UL
NRBC BLD-RTO: 0 /100 WBC
PLATELET # BLD AUTO: 172 K/UL (ref 164–446)
PMV BLD AUTO: 11.5 FL (ref 9–12.9)
POTASSIUM SERPL-SCNC: 4.1 MMOL/L (ref 3.6–5.5)
PROT SERPL-MCNC: 7.8 G/DL (ref 6–8.2)
RBC # BLD AUTO: 5.44 M/UL (ref 4.7–6.1)
SODIUM SERPL-SCNC: 133 MMOL/L (ref 135–145)
TRIGL SERPL-MCNC: 275 MG/DL (ref 0–149)
TSH SERPL DL<=0.005 MIU/L-ACNC: 3.04 UIU/ML (ref 0.38–5.33)
WBC # BLD AUTO: 3.7 K/UL (ref 4.8–10.8)

## 2020-11-07 PROCEDURE — 83721 ASSAY OF BLOOD LIPOPROTEIN: CPT

## 2020-11-07 PROCEDURE — 80061 LIPID PANEL: CPT

## 2020-11-07 PROCEDURE — 36415 COLL VENOUS BLD VENIPUNCTURE: CPT

## 2020-11-07 PROCEDURE — 85025 COMPLETE CBC W/AUTO DIFF WBC: CPT

## 2020-11-07 PROCEDURE — 80053 COMPREHEN METABOLIC PANEL: CPT

## 2020-11-07 PROCEDURE — 84443 ASSAY THYROID STIM HORMONE: CPT

## 2020-11-10 LAB — LDLC SERPL-MCNC: 109 MG/DL (ref 0–129)

## 2021-07-28 DIAGNOSIS — H53.9 VISION CHANGES: ICD-10-CM

## 2022-06-13 ENCOUNTER — HOSPITAL ENCOUNTER (OUTPATIENT)
Dept: LAB | Facility: MEDICAL CENTER | Age: 52
End: 2022-06-13
Attending: PHYSICIAN ASSISTANT
Payer: COMMERCIAL

## 2022-06-13 LAB
ALBUMIN SERPL BCP-MCNC: 4.5 G/DL (ref 3.2–4.9)
ALBUMIN/GLOB SERPL: 1.6 G/DL
ALP SERPL-CCNC: 78 U/L (ref 30–99)
ALT SERPL-CCNC: 23 U/L (ref 2–50)
ANION GAP SERPL CALC-SCNC: 11 MMOL/L (ref 7–16)
AST SERPL-CCNC: 21 U/L (ref 12–45)
BASOPHILS # BLD AUTO: 0.9 % (ref 0–1.8)
BASOPHILS # BLD: 0.04 K/UL (ref 0–0.12)
BILIRUB SERPL-MCNC: 0.5 MG/DL (ref 0.1–1.5)
BUN SERPL-MCNC: 14 MG/DL (ref 8–22)
CALCIUM SERPL-MCNC: 8.9 MG/DL (ref 8.5–10.5)
CHLORIDE SERPL-SCNC: 105 MMOL/L (ref 96–112)
CHOLEST SERPL-MCNC: 181 MG/DL (ref 100–199)
CO2 SERPL-SCNC: 23 MMOL/L (ref 20–33)
CREAT SERPL-MCNC: 0.78 MG/DL (ref 0.5–1.4)
EOSINOPHIL # BLD AUTO: 0.05 K/UL (ref 0–0.51)
EOSINOPHIL NFR BLD: 1.2 % (ref 0–6.9)
ERYTHROCYTE [DISTWIDTH] IN BLOOD BY AUTOMATED COUNT: 42.8 FL (ref 35.9–50)
EST. AVERAGE GLUCOSE BLD GHB EST-MCNC: 111 MG/DL
FASTING STATUS PATIENT QL REPORTED: NORMAL
GFR SERPLBLD CREATININE-BSD FMLA CKD-EPI: 107 ML/MIN/1.73 M 2
GLOBULIN SER CALC-MCNC: 2.9 G/DL (ref 1.9–3.5)
GLUCOSE SERPL-MCNC: 107 MG/DL (ref 65–99)
HBA1C MFR BLD: 5.5 % (ref 4–5.6)
HCT VFR BLD AUTO: 49.1 % (ref 42–52)
HDLC SERPL-MCNC: 30 MG/DL
HGB BLD-MCNC: 16.7 G/DL (ref 14–18)
IMM GRANULOCYTES # BLD AUTO: 0.01 K/UL (ref 0–0.11)
IMM GRANULOCYTES NFR BLD AUTO: 0.2 % (ref 0–0.9)
LDLC SERPL CALC-MCNC: 103 MG/DL
LYMPHOCYTES # BLD AUTO: 1.59 K/UL (ref 1–4.8)
LYMPHOCYTES NFR BLD: 37.7 % (ref 22–41)
MCH RBC QN AUTO: 30.9 PG (ref 27–33)
MCHC RBC AUTO-ENTMCNC: 34 G/DL (ref 33.7–35.3)
MCV RBC AUTO: 90.8 FL (ref 81.4–97.8)
MONOCYTES # BLD AUTO: 0.4 K/UL (ref 0–0.85)
MONOCYTES NFR BLD AUTO: 9.5 % (ref 0–13.4)
NEUTROPHILS # BLD AUTO: 2.13 K/UL (ref 1.82–7.42)
NEUTROPHILS NFR BLD: 50.5 % (ref 44–72)
NRBC # BLD AUTO: 0 K/UL
NRBC BLD-RTO: 0 /100 WBC
PLATELET # BLD AUTO: 173 K/UL (ref 164–446)
PMV BLD AUTO: 11.9 FL (ref 9–12.9)
POTASSIUM SERPL-SCNC: 3.9 MMOL/L (ref 3.6–5.5)
PROT SERPL-MCNC: 7.4 G/DL (ref 6–8.2)
RBC # BLD AUTO: 5.41 M/UL (ref 4.7–6.1)
SODIUM SERPL-SCNC: 139 MMOL/L (ref 135–145)
TRIGL SERPL-MCNC: 240 MG/DL (ref 0–149)
WBC # BLD AUTO: 4.2 K/UL (ref 4.8–10.8)

## 2022-06-13 PROCEDURE — 80061 LIPID PANEL: CPT

## 2022-06-13 PROCEDURE — 85025 COMPLETE CBC W/AUTO DIFF WBC: CPT

## 2022-06-13 PROCEDURE — 83036 HEMOGLOBIN GLYCOSYLATED A1C: CPT

## 2022-06-13 PROCEDURE — 36415 COLL VENOUS BLD VENIPUNCTURE: CPT

## 2022-06-13 PROCEDURE — 80053 COMPREHEN METABOLIC PANEL: CPT

## 2023-11-13 ENCOUNTER — HOSPITAL ENCOUNTER (OUTPATIENT)
Dept: LAB | Facility: MEDICAL CENTER | Age: 53
End: 2023-11-13
Attending: NURSE PRACTITIONER
Payer: COMMERCIAL

## 2023-11-13 LAB
ALBUMIN SERPL BCP-MCNC: 4.7 G/DL (ref 3.2–4.9)
ALBUMIN/GLOB SERPL: 1.7 G/DL
ALP SERPL-CCNC: 86 U/L (ref 30–99)
ALT SERPL-CCNC: 34 U/L (ref 2–50)
ANION GAP SERPL CALC-SCNC: 12 MMOL/L (ref 7–16)
AST SERPL-CCNC: 32 U/L (ref 12–45)
BASOPHILS # BLD AUTO: 0.9 % (ref 0–1.8)
BASOPHILS # BLD: 0.04 K/UL (ref 0–0.12)
BILIRUB SERPL-MCNC: 0.3 MG/DL (ref 0.1–1.5)
BUN SERPL-MCNC: 10 MG/DL (ref 8–22)
CALCIUM ALBUM COR SERPL-MCNC: 8.3 MG/DL (ref 8.5–10.5)
CALCIUM SERPL-MCNC: 8.9 MG/DL (ref 8.5–10.5)
CHLORIDE SERPL-SCNC: 103 MMOL/L (ref 96–112)
CO2 SERPL-SCNC: 22 MMOL/L (ref 20–33)
CREAT SERPL-MCNC: 0.73 MG/DL (ref 0.5–1.4)
EOSINOPHIL # BLD AUTO: 0.07 K/UL (ref 0–0.51)
EOSINOPHIL NFR BLD: 1.6 % (ref 0–6.9)
ERYTHROCYTE [DISTWIDTH] IN BLOOD BY AUTOMATED COUNT: 43.5 FL (ref 35.9–50)
EST. AVERAGE GLUCOSE BLD GHB EST-MCNC: 111 MG/DL
FERRITIN SERPL-MCNC: 517 NG/ML (ref 22–322)
GFR SERPLBLD CREATININE-BSD FMLA CKD-EPI: 108 ML/MIN/1.73 M 2
GLOBULIN SER CALC-MCNC: 2.8 G/DL (ref 1.9–3.5)
GLUCOSE SERPL-MCNC: 113 MG/DL (ref 65–99)
HBA1C MFR BLD: 5.5 % (ref 4–5.6)
HCT VFR BLD AUTO: 49.5 % (ref 42–52)
HGB BLD-MCNC: 16.7 G/DL (ref 14–18)
IMM GRANULOCYTES # BLD AUTO: 0.01 K/UL (ref 0–0.11)
IMM GRANULOCYTES NFR BLD AUTO: 0.2 % (ref 0–0.9)
IRON SATN MFR SERPL: 32 % (ref 15–55)
IRON SERPL-MCNC: 99 UG/DL (ref 50–180)
LYMPHOCYTES # BLD AUTO: 1.39 K/UL (ref 1–4.8)
LYMPHOCYTES NFR BLD: 31.2 % (ref 22–41)
MCH RBC QN AUTO: 31.4 PG (ref 27–33)
MCHC RBC AUTO-ENTMCNC: 33.7 G/DL (ref 32.3–36.5)
MCV RBC AUTO: 93 FL (ref 81.4–97.8)
MONOCYTES # BLD AUTO: 0.4 K/UL (ref 0–0.85)
MONOCYTES NFR BLD AUTO: 9 % (ref 0–13.4)
NEUTROPHILS # BLD AUTO: 2.54 K/UL (ref 1.82–7.42)
NEUTROPHILS NFR BLD: 57.1 % (ref 44–72)
NRBC # BLD AUTO: 0 K/UL
NRBC BLD-RTO: 0 /100 WBC (ref 0–0.2)
PLATELET # BLD AUTO: 176 K/UL (ref 164–446)
PMV BLD AUTO: 11.9 FL (ref 9–12.9)
POTASSIUM SERPL-SCNC: 4.1 MMOL/L (ref 3.6–5.5)
PROT SERPL-MCNC: 7.5 G/DL (ref 6–8.2)
RBC # BLD AUTO: 5.32 M/UL (ref 4.7–6.1)
SODIUM SERPL-SCNC: 137 MMOL/L (ref 135–145)
TIBC SERPL-MCNC: 313 UG/DL (ref 250–450)
TSH SERPL DL<=0.005 MIU/L-ACNC: 3.38 UIU/ML (ref 0.38–5.33)
UIBC SERPL-MCNC: 214 UG/DL (ref 110–370)
WBC # BLD AUTO: 4.5 K/UL (ref 4.8–10.8)

## 2023-11-13 PROCEDURE — 80053 COMPREHEN METABOLIC PANEL: CPT

## 2023-11-13 PROCEDURE — 36415 COLL VENOUS BLD VENIPUNCTURE: CPT

## 2023-11-13 PROCEDURE — 83550 IRON BINDING TEST: CPT

## 2023-11-13 PROCEDURE — 82728 ASSAY OF FERRITIN: CPT

## 2023-11-13 PROCEDURE — 85025 COMPLETE CBC W/AUTO DIFF WBC: CPT

## 2023-11-13 PROCEDURE — 83540 ASSAY OF IRON: CPT

## 2023-11-13 PROCEDURE — 84443 ASSAY THYROID STIM HORMONE: CPT

## 2023-11-13 PROCEDURE — 83036 HEMOGLOBIN GLYCOSYLATED A1C: CPT

## 2025-06-14 ENCOUNTER — HOSPITAL ENCOUNTER (OUTPATIENT)
Dept: LAB | Facility: MEDICAL CENTER | Age: 55
End: 2025-06-14
Attending: STUDENT IN AN ORGANIZED HEALTH CARE EDUCATION/TRAINING PROGRAM
Payer: COMMERCIAL

## 2025-06-14 LAB
ALBUMIN SERPL BCP-MCNC: 4.5 G/DL (ref 3.2–4.9)
ALBUMIN/GLOB SERPL: 1.4 G/DL
ALP SERPL-CCNC: 92 U/L (ref 30–99)
ALT SERPL-CCNC: 33 U/L (ref 2–50)
ANION GAP SERPL CALC-SCNC: 10 MMOL/L (ref 7–16)
AST SERPL-CCNC: 28 U/L (ref 12–45)
BASOPHILS # BLD AUTO: 0.8 % (ref 0–1.8)
BASOPHILS # BLD: 0.04 K/UL (ref 0–0.12)
BILIRUB SERPL-MCNC: 0.6 MG/DL (ref 0.1–1.5)
BUN SERPL-MCNC: 14 MG/DL (ref 8–22)
CALCIUM ALBUM COR SERPL-MCNC: 8.6 MG/DL (ref 8.5–10.5)
CALCIUM SERPL-MCNC: 9 MG/DL (ref 8.5–10.5)
CHLORIDE SERPL-SCNC: 104 MMOL/L (ref 96–112)
CHOLEST SERPL-MCNC: 167 MG/DL (ref 100–199)
CO2 SERPL-SCNC: 24 MMOL/L (ref 20–33)
CREAT SERPL-MCNC: 0.75 MG/DL (ref 0.5–1.4)
EOSINOPHIL # BLD AUTO: 0.04 K/UL (ref 0–0.51)
EOSINOPHIL NFR BLD: 0.8 % (ref 0–6.9)
ERYTHROCYTE [DISTWIDTH] IN BLOOD BY AUTOMATED COUNT: 41.4 FL (ref 35.9–50)
EST. AVERAGE GLUCOSE BLD GHB EST-MCNC: 117 MG/DL
FASTING STATUS PATIENT QL REPORTED: NORMAL
GFR SERPLBLD CREATININE-BSD FMLA CKD-EPI: 106 ML/MIN/1.73 M 2
GLOBULIN SER CALC-MCNC: 3.2 G/DL (ref 1.9–3.5)
GLUCOSE SERPL-MCNC: 114 MG/DL (ref 65–99)
HBA1C MFR BLD: 5.7 % (ref 4–5.6)
HCT VFR BLD AUTO: 49.8 % (ref 42–52)
HDLC SERPL-MCNC: 28 MG/DL
HGB BLD-MCNC: 16.6 G/DL (ref 14–18)
IMM GRANULOCYTES # BLD AUTO: 0.02 K/UL (ref 0–0.11)
IMM GRANULOCYTES NFR BLD AUTO: 0.4 % (ref 0–0.9)
LDLC SERPL CALC-MCNC: 73 MG/DL
LYMPHOCYTES # BLD AUTO: 1.38 K/UL (ref 1–4.8)
LYMPHOCYTES NFR BLD: 28.4 % (ref 22–41)
MCH RBC QN AUTO: 30.1 PG (ref 27–33)
MCHC RBC AUTO-ENTMCNC: 33.3 G/DL (ref 32.3–36.5)
MCV RBC AUTO: 90.4 FL (ref 81.4–97.8)
MONOCYTES # BLD AUTO: 0.37 K/UL (ref 0–0.85)
MONOCYTES NFR BLD AUTO: 7.6 % (ref 0–13.4)
NEUTROPHILS # BLD AUTO: 3.01 K/UL (ref 1.82–7.42)
NEUTROPHILS NFR BLD: 62 % (ref 44–72)
NRBC # BLD AUTO: 0 K/UL
NRBC BLD-RTO: 0 /100 WBC (ref 0–0.2)
PLATELET # BLD AUTO: 185 K/UL (ref 164–446)
PMV BLD AUTO: 12 FL (ref 9–12.9)
POTASSIUM SERPL-SCNC: 4.6 MMOL/L (ref 3.6–5.5)
PROT SERPL-MCNC: 7.7 G/DL (ref 6–8.2)
RBC # BLD AUTO: 5.51 M/UL (ref 4.7–6.1)
SODIUM SERPL-SCNC: 138 MMOL/L (ref 135–145)
TRIGL SERPL-MCNC: 329 MG/DL (ref 0–149)
TSH SERPL DL<=0.005 MIU/L-ACNC: 4.19 UIU/ML (ref 0.38–5.33)
WBC # BLD AUTO: 4.9 K/UL (ref 4.8–10.8)

## 2025-06-14 PROCEDURE — 83036 HEMOGLOBIN GLYCOSYLATED A1C: CPT

## 2025-06-14 PROCEDURE — 83013 H PYLORI (C-13) BREATH: CPT

## 2025-06-14 PROCEDURE — 36415 COLL VENOUS BLD VENIPUNCTURE: CPT

## 2025-06-14 PROCEDURE — 80061 LIPID PANEL: CPT

## 2025-06-14 PROCEDURE — 85025 COMPLETE CBC W/AUTO DIFF WBC: CPT

## 2025-06-14 PROCEDURE — 80053 COMPREHEN METABOLIC PANEL: CPT

## 2025-06-14 PROCEDURE — 84443 ASSAY THYROID STIM HORMONE: CPT

## 2025-06-15 LAB — UREA BREATH TEST QL: NEGATIVE
